# Patient Record
Sex: MALE
[De-identification: names, ages, dates, MRNs, and addresses within clinical notes are randomized per-mention and may not be internally consistent; named-entity substitution may affect disease eponyms.]

---

## 2019-07-20 ENCOUNTER — TRANSCRIPTION ENCOUNTER (OUTPATIENT)
Age: 29
End: 2019-07-20

## 2021-05-16 ENCOUNTER — EMERGENCY (EMERGENCY)
Facility: HOSPITAL | Age: 31
LOS: 1 days | Discharge: HOME | End: 2021-05-16
Attending: EMERGENCY MEDICINE | Admitting: EMERGENCY MEDICINE
Payer: COMMERCIAL

## 2021-05-16 VITALS
WEIGHT: 203.93 LBS | TEMPERATURE: 97 F | HEART RATE: 72 BPM | OXYGEN SATURATION: 97 % | DIASTOLIC BLOOD PRESSURE: 76 MMHG | HEIGHT: 74 IN | RESPIRATION RATE: 18 BRPM | SYSTOLIC BLOOD PRESSURE: 147 MMHG

## 2021-05-16 DIAGNOSIS — T15.91XA FOREIGN BODY ON EXTERNAL EYE, PART UNSPECIFIED, RIGHT EYE, INITIAL ENCOUNTER: ICD-10-CM
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
## 2021-05-16 PROCEDURE — 99283 EMERGENCY DEPT VISIT LOW MDM: CPT | Mod: 25

## 2021-05-16 PROCEDURE — 99284 EMERGENCY DEPT VISIT MOD MDM: CPT | Mod: 25

## 2021-05-16 PROCEDURE — 65220 REMOVE FOREIGN BODY FROM EYE: CPT | Mod: RT

## 2021-05-16 PROCEDURE — 65205 REMOVE FOREIGN BODY FROM EYE: CPT | Mod: RT

## 2021-05-16 RX ORDER — FLUORESCEIN SODIUM 9 MG
1 STRIP OPHTHALMIC (EYE) ONCE
Refills: 0 | Status: COMPLETED | OUTPATIENT
Start: 2021-05-16 | End: 2021-05-16

## 2021-05-16 RX ORDER — OFLOXACIN 0.3 %
2 DROPS OPHTHALMIC (EYE)
Qty: 1 | Refills: 0
Start: 2021-05-16

## 2021-05-16 RX ORDER — ERYTHROMYCIN BASE 5 MG/GRAM
1 OINTMENT (GRAM) OPHTHALMIC (EYE) ONCE
Refills: 0 | Status: COMPLETED | OUTPATIENT
Start: 2021-05-16 | End: 2021-05-16

## 2021-05-16 RX ADMIN — Medication 1 APPLICATION(S): at 19:53

## 2021-05-16 RX ADMIN — Medication 1 DROP(S): at 19:53

## 2021-05-16 NOTE — ED PROVIDER NOTE - CHPI ED SYMPTOMS NEG
no vision changes, no excessive tearing, no flashes of light, no floaters, no curtains, no other injuries/no pain/no foreign body

## 2021-05-16 NOTE — ED PROVIDER NOTE - PHYSICAL EXAMINATION
CONSTITUTIONAL: Well-appearing; well-nourished; in no apparent distress.   HEAD: Normocephalic; atraumatic.   EYES:  PERRL b/l, EOMI, 1mm circular metallic foreign body noted in the R eye overlying the iris at the 6:00 position, no abrasion seen on fluorescein stain.   ENT: normal nose; no rhinorrhea; normal pharynx with no erythema or lesions.   NECK: Supple; non-tender;   CARDIOVASCULAR: Normal S1, S2; no murmurs, rubs, or gallops. Regular rate and rhythm.   RESPIRATORY: Breathing easily; breath sounds clear and equal bilaterally; no wheezes, rhonchi, or rales.  GI: Soft; non-distended; non-tender; no palpable organomegaly.   EXT: No cyanosis or edema; N/V intact  SKIN: Normal for age and race; warm; dry; good turgor; no apparent lesions or rash.   NEURO: A & O x 3; face symmetric; grossly unremarkable.   PSYCHOLOGICAL: The patient’s mood and manner are appropriate.

## 2021-05-16 NOTE — ED ADULT TRIAGE NOTE - CHIEF COMPLAINT QUOTE
Patient was sent to ED from urgent care for foreign body to right eye. Patient reports is  and believes a piece of metal fell into right eye. Right sclera redness observed. Patient denies vision changes.

## 2021-05-16 NOTE — ED PROVIDER NOTE - CARE PROVIDER_API CALL
Tiffany Browne (DO)  Ophthalmology  [address] [place] [address]  Phone: [phone]  Fax: [phone]  Follow Up Time: Urgent    Carlos Gamino (OD)  Ophthalmology  [address] [place] [address]  Phone: [phone]  Fax: [phone]  Follow Up Time:

## 2021-05-16 NOTE — ED PROVIDER NOTE - OBJECTIVE STATEMENT
32 y/o M with no PMHx presents to the ED for concern for metallic foreign body to the R eye for the past 3 days. Pt works as an  and states he was wearing work goggles, but felt a small fragment of metal fly into his eye. States he had pain at that time but the pain slowly improved, but decided to seek care today as the eye still appears very red. Denies any foreign body sensation currently, eye pain, vision changes, excessive tearing, flashes of light, floaters, curtains over the eyes, or any additional injuries.

## 2021-05-16 NOTE — ED PROVIDER NOTE - NSFOLLOWUPINSTRUCTIONS_ED_ALL_ED_FT
Call tomorrow for an appointment to be seen tomorrow. We spoke with the on call - the equipment that they need is not available in the ED so it is best to be seen in their clinic tomorrow    [phone] -  at [facility]     Use the eye drops 4 times a day until seen by an eye doctor who will give further instructions      Eye Foreign Body    AMBULATORY CARE:    An eye foreign body (EFB) is an object that gets stuck in your eye. Tiny pieces of metal, dust, wood, and sand are the most common foreign bodies.      Signs and symptoms:   •The feeling that something is in your eye      •Eye pain, redness, or watering      •Sensitivity to light      •Blurry vision or changes in your vision      Seek care immediately if:   •You suddenly lose your vision.      •You have severe eye pain.      Call your doctor or ophthalmologist if:   •You have new or worse eye swelling.      •Your symptoms do not get better, even after the foreign body is removed.      •You have white or yellow fluid draining from your eye.      •You have questions or concerns about your condition or care.      Treatment for an EFB will include medicine to decrease pain and prevent an infection. Your healthcare provider may numb your eye and flush it with liquid to help remove the FB. The provider may also use a cotton swab or other tools to help remove the FB. If the FB is hard to remove or has damaged deeper parts of your eye, you will need surgery to remove it.    Help your eye heal: You may have pain, sensitivity to light, or blurry vision for a few days. Do the following to help your eye heal:   •Do not rub your eye. This may cause more damage or infection.      •Do not wear your contacts lenses until your eye heals. Ask your healthcare provider how long to follow this direction.      •Wear sunglasses as directed. Sunglasses help protect the eye and decrease sensitivity to light.      Prevent another EFB:   •Wear safety glasses, eye shields, or goggles. These items can prevent eye injury. Make sure the eyewear wraps around the sides of your face. Wear these items while you work with chemicals, metal, wood, or bodily fluids such as blood. Also wear protective eyewear during sports such as racquetball or swimming. Do not use regular eye glasses for eye protection. They will not protect your eyes from foreign bodies or chemicals.        Follow up and ophthalmologist in 1 to 2 days: Write down your questions so you remember to ask them

## 2021-05-16 NOTE — ED PROVIDER NOTE - CLINICAL SUMMARY MEDICAL DECISION MAKING FREE TEXT BOX
32 y/o M here w/ 3 days of superficial appearing small metallic foreign body. NO penetrating trauma deep into the globe. D/w on-call ophthalmologist, who will see pt in clinic tomorrow for followup. Will start pt on ofloxacin abx 4x a day. No indication at this time for emergent ophtho consult in the ED. Was able to remove the most superficial part of the foreign body.

## 2021-05-16 NOTE — ED ADULT NURSE NOTE - NSIMPLEMENTINTERV_GEN_ALL_ED
Implemented All Universal Safety Interventions:  [place] to call system. Call bell, personal items and telephone within reach. Instruct patient to call for assistance. Room bathroom lighting operational. Non-slip footwear when patient is off stretcher. Physically safe environment: no spills, clutter or unnecessary equipment. Stretcher in lowest position, wheels locked, appropriate side rails in place.

## 2021-05-17 ENCOUNTER — APPOINTMENT (OUTPATIENT)
Dept: OPHTHALMOLOGY | Facility: CLINIC | Age: 31
End: 2021-05-17

## 2021-05-18 PROBLEM — Z78.9 OTHER SPECIFIED HEALTH STATUS: Chronic | Status: ACTIVE | Noted: 2021-05-16

## 2021-05-21 ENCOUNTER — APPOINTMENT (OUTPATIENT)
Age: 31
End: 2021-05-21

## 2021-12-16 ENCOUNTER — TRANSCRIPTION ENCOUNTER (OUTPATIENT)
Age: 31
End: 2021-12-16